# Patient Record
Sex: MALE | Race: WHITE | NOT HISPANIC OR LATINO | Employment: FULL TIME | URBAN - METROPOLITAN AREA
[De-identification: names, ages, dates, MRNs, and addresses within clinical notes are randomized per-mention and may not be internally consistent; named-entity substitution may affect disease eponyms.]

---

## 2024-10-23 NOTE — PROGRESS NOTES
Subjective   Patient ID: Delonte Long is a 53 y.o. male who presents for No chief complaint on file..  Sore Throat   This is a chronic problem. The current episode started more than 1 year ago. The problem has been waxing and waning. Neither side of throat is experiencing more pain than the other. There has been no fever. The pain is at a severity of 2/10. The pain is mild. Associated symptoms include coughing, a hoarse voice and swollen glands. Pertinent negatives include no abdominal pain, congestion, diarrhea, drooling, ear discharge, ear pain, headaches, plugged ear sensation, neck pain, shortness of breath, stridor, trouble swallowing or vomiting.       Review of Systems   HENT:  Positive for hoarse voice and sore throat. Negative for congestion, drooling, ear discharge, ear pain and trouble swallowing.    Respiratory:  Positive for cough. Negative for shortness of breath and stridor.    Gastrointestinal:  Negative for abdominal pain, diarrhea and vomiting.   Musculoskeletal:  Negative for neck pain.   Neurological:  Negative for headaches.       Past Medical History:   Diagnosis Date    Allergy status to unspecified drugs, medicaments and biological substances     History of allergy        No current outpatient medications on file.     Past Surgical History:   Procedure Laterality Date    OTHER SURGICAL HISTORY  03/19/2021    Knee arthroscopy        Social History     Tobacco Use    Smoking status: Not on file    Smokeless tobacco: Not on file   Substance Use Topics    Alcohol use: Not on file        No family history on file.    Not on File  There were no vitals taken for this visit.    Objective   Physical Exam  Examination:    CONSTITUTIONAL: Alert, in no acute distress, normal pitch/clarity of voice, well-developed, well-nourished, cooperative.  HEAD/FACE: Normocephalic, atraumatic, no tenderness over the sinuses, facial strength and movement symmetric.    SKIN: Good turgor, no rashes, no suspicious  lesions, in the head and neck.    EYES: Both eyes have normal extraocular movements with no nystagmus, pupils are equal and reactive to light and accommodation, conjunctiva is clear.    EARS: Both ears are negative for external skin abnormalities, external auditory canals are without lesions or signs of inflammation, tympanic membranes are intact and are of normal color and texture, no effusions are seen, light reflexes normal, no mastoid tenderness is noted to palpation, objective hearing is intact.    NOSE: No external skin lesions are noted, nares are patent, septum is intact with anterior deviation to the right and mid the posterior to of the left, and noninflamed, nasal turbinates are normal in appearance, sinuses are nontender to palpation bilaterally, no internal lesions or polyps are noted, no discharge is noted.    OROPHARYNX/ORAL CAVITY: Mucous membranes of the oropharynx and the oral cavity proper are without lesions or ulcerations, tongue mobility is normal and no lesions are noted, gingiva and alveolar mucosa is intact without lesions, oral mucosa is moist, muscular movement of the palate and gag reflex are normal.    NASOPHARYNX: Mucous membranes are noninflamed and no secretions or lesions are noted.    LARYNX: No mucosal inflammation or exudates are noted, arytenoids are normal in appearance and mobility, false vocal cords are without lesions as is the remainder of the supraglottic larynx, true vocal folds are mobile without inflammation or obstructions and no masses or lesions are noted in the endolarynx.    NECK: No lymphadenopathy is palpated, neck is supple with full range of motion, thyroid is without swelling or tenderness, trachea is midline, no neck masses are noted.    Lymphatics: No cervical adenopathy or supraclavicular adenopathy noted to palpation.    HEART/VASCULAR: No jugular venous distention is noted, carotid pulsations are intact with a regular rate and rhythm noted,    PULMONARY:  Good air movement with normal inspiratory/expiratory effort is noted, no audible wheezing is appreciated.    NEUROLOGIC: Alert and oriented, cranial nerves are grossly intact, gait is normal, sensation in the head and neck is intact,    PSYCH: oriented to person, place and time, normal mood and affect.    EXTREMITIES: No motor dysfunction of the upper and lower extremity is noted.    Patient ID: Delonte Long is a 53 y.o. male.    Laryngoscopy    Date/Time: 10/24/2024 10:55 AM    Performed by: Addy Mcfadden DMD, MD  Authorized by: Addy Mcfadden DMD, MD    Consent:     Consent obtained:  Verbal    Consent given by:  Patient    Risks discussed:  Pain    Alternatives discussed:  No treatment and referral  Procedure details:     Indications: hoarseness, dysphagia, or aspiration    Post-procedure details:     Patient tolerance of procedure:  Tolerated well, no immediate complications  Comments:      LARYNGOSCOPY    Indications: Muscle tension dysphonia, postnasal discharge    Consent:  The procedure was discussed including the possible risks and benefits as well as alternative treatments with the patient. Verbal consent was obtained.    Procedure:  Topical Mark-Synephrine and 4% lidocaine  was applied as a decongestant and anesthetic nasally. A fiberoptic laryngoscope is inserted nasally and the upper aerodigestive tract is examined.    Findings: Nasally on the right-hand side there was anterior deviation of the septum, no mucous membrane inflammation polyps or purulent discharge was noted.  Left-hand side there was a mid to posterior septal spurring and deviation.  No polyps or purulent discharge was noted.  The nasopharynx was patent with no lesions.  Both eustachian tube openings were intact.  Base the tongue was symmetric with no lesions.  Pharyngeal walls showed some pharyngeal studding but no evidence for obstructions or lesions.  The endolarynx revealed vocal cords that were mobile good coloration good  closure of the glottis.  There was no postcricoid inflammation or swelling or entrapment of mucous noted.    Post procedure:  The patient tolerated the procedure well without complications.    Assessment/Plan   Muscle tension dysphonia  Nasal discharge  Nasal septal deviation  Hypertrophy  History of allergies  History of sleep apnea       I discussed the clinical finds with the patient.  From the standpoint of his anatomy there is no signs of acute inflammation and/or difficulties with polyps or movement disorders of the larynx.  He does have symptoms suggesting muscle tension dysphonia and also has some reactive airway issues.  He is going to be scheduled for speech therapy and I did discuss with him nasal discharge issues and treatment using saline rinses in conjunction with his antihistamine use.  I also felt he should use Pepcid once daily for any potential LPR association with his symptoms.  If therapy is helpful then no further intervention would be needed but if he continues to have difficulties then a referral to  laryngology would be done.  I did not see any evidence for infections in the respiratory tract or in his larynx.    Addy Mcfadden DMD, MD 10/23/24 2:02 PM

## 2024-10-24 ENCOUNTER — APPOINTMENT (OUTPATIENT)
Dept: OTOLARYNGOLOGY | Facility: CLINIC | Age: 54
End: 2024-10-24
Payer: COMMERCIAL

## 2024-10-24 VITALS — WEIGHT: 170 LBS | BODY MASS INDEX: 25.18 KG/M2 | HEIGHT: 69 IN

## 2024-10-24 DIAGNOSIS — J34.2 NASAL SEPTAL DEVIATION: ICD-10-CM

## 2024-10-24 DIAGNOSIS — Z88.9 HISTORY OF ALLERGY: ICD-10-CM

## 2024-10-24 DIAGNOSIS — Z86.69 HISTORY OF SLEEP APNEA: ICD-10-CM

## 2024-10-24 DIAGNOSIS — R49.0 HOARSENESS OF VOICE: ICD-10-CM

## 2024-10-24 DIAGNOSIS — R05.3 CHRONIC COUGH: ICD-10-CM

## 2024-10-24 DIAGNOSIS — R09.82 POSTNASAL DISCHARGE: ICD-10-CM

## 2024-10-24 DIAGNOSIS — R07.0 THROAT PAIN IN ADULT: Primary | ICD-10-CM

## 2024-10-24 PROCEDURE — 1036F TOBACCO NON-USER: CPT | Performed by: OTOLARYNGOLOGY

## 2024-10-24 PROCEDURE — 3008F BODY MASS INDEX DOCD: CPT | Performed by: OTOLARYNGOLOGY

## 2024-10-24 PROCEDURE — 31575 DIAGNOSTIC LARYNGOSCOPY: CPT | Performed by: OTOLARYNGOLOGY

## 2024-10-24 PROCEDURE — 99203 OFFICE O/P NEW LOW 30 MIN: CPT | Performed by: OTOLARYNGOLOGY

## 2024-10-24 ASSESSMENT — ENCOUNTER SYMPTOMS
DIARRHEA: 0
NECK PAIN: 0
HEADACHES: 0
HOARSE VOICE: 1
SHORTNESS OF BREATH: 0
VOMITING: 0
STRIDOR: 0
SORE THROAT: 1
COUGH: 1
SWOLLEN GLANDS: 1
TROUBLE SWALLOWING: 0
ABDOMINAL PAIN: 0

## 2024-12-10 ENCOUNTER — OFFICE VISIT (OUTPATIENT)
Dept: URGENT CARE | Age: 54
End: 2024-12-10
Payer: COMMERCIAL

## 2024-12-10 VITALS
SYSTOLIC BLOOD PRESSURE: 148 MMHG | OXYGEN SATURATION: 98 % | DIASTOLIC BLOOD PRESSURE: 90 MMHG | TEMPERATURE: 100.5 F | HEART RATE: 97 BPM

## 2024-12-10 DIAGNOSIS — J22 LOWER RESPIRATORY INFECTION: Primary | ICD-10-CM

## 2024-12-10 PROCEDURE — 99213 OFFICE O/P EST LOW 20 MIN: CPT | Performed by: PHYSICIAN ASSISTANT

## 2024-12-10 RX ORDER — AZITHROMYCIN 250 MG/1
TABLET, FILM COATED ORAL
Qty: 6 TABLET | Refills: 0 | Status: SHIPPED | OUTPATIENT
Start: 2024-12-10 | End: 2024-12-15

## 2024-12-10 ASSESSMENT — ENCOUNTER SYMPTOMS
SHORTNESS OF BREATH: 0
FEVER: 1
COUGH: 1
WHEEZING: 1
DIAPHORESIS: 1
CHEST TIGHTNESS: 0
SORE THROAT: 1
RHINORRHEA: 1
CHILLS: 1

## 2024-12-10 NOTE — PROGRESS NOTES
Subjective   Patient ID: Delonte Long is a 54 y.o. male. They present today with a chief complaint of Nasal Congestion (Cough, drainage, chills).    History of Present Illness  Patient presents for a one week history of upper respiratory illness symptoms including cough, chest congestion, post nasal drainage, sore throat, fevers and chills, runny nose. He states that he is prone to pneumonia. He has noticed slight wheezing in his breathing at times period he states that he does not have a history of asthma and does not smoke. He denies any nasal congestion or ear pain. He feels that his symptoms are worsening over time.          Past Medical History  Allergies as of 12/10/2024    (No Known Allergies)       (Not in a hospital admission)       Past Medical History:   Diagnosis Date    Allergy status to unspecified drugs, medicaments and biological substances     History of allergy       Past Surgical History:   Procedure Laterality Date    OTHER SURGICAL HISTORY  03/19/2021    Knee arthroscopy        reports that he has never smoked. He has never used smokeless tobacco. He reports current alcohol use of about 5.0 standard drinks of alcohol per week. He reports that he does not use drugs.    Review of Systems  Review of Systems   Constitutional:  Positive for chills, diaphoresis and fever.   HENT:  Positive for postnasal drip, rhinorrhea and sore throat. Negative for congestion and ear pain.    Respiratory:  Positive for cough and wheezing. Negative for chest tightness and shortness of breath.                                   Objective    Vitals:    12/10/24 1610 12/10/24 1633   BP: (!) 158/92 148/90   Pulse: 97    Temp: (!) 38.1 °C (100.5 °F)    SpO2: 98%      No LMP for male patient.    Physical Exam  Vitals reviewed.   Constitutional:       General: He is not in acute distress.     Appearance: He is not ill-appearing.   HENT:      Head: Normocephalic.      Right Ear: Tympanic membrane and ear canal normal.       Left Ear: Tympanic membrane and ear canal normal.      Nose: Congestion present. No rhinorrhea.      Mouth/Throat:      Pharynx: No oropharyngeal exudate or posterior oropharyngeal erythema.   Cardiovascular:      Rate and Rhythm: Normal rate and regular rhythm.      Heart sounds: Normal heart sounds.   Pulmonary:      Effort: Pulmonary effort is normal. No respiratory distress.      Breath sounds: Normal breath sounds. No wheezing, rhonchi or rales.   Lymphadenopathy:      Cervical: Cervical adenopathy present.   Neurological:      Mental Status: He is alert.         Procedures    Point of Care Test & Imaging Results from this visit  No results found for this visit on 12/10/24.   No results found.    Diagnostic study results (if any) were reviewed by Tanika Pfeiffer PA-C.    Assessment/Plan   Allergies, medications, history, and pertinent labs/EKGs/Imaging reviewed by Tanika Pfeiffer PA-C.     Medical Decision Making  MDM- History and exam consistent with acute lower respiratory tract infection. Given length of symptoms, no influenza or COVID testing done.  Based on current exam and past medical history, plan is for antibiotics and symptomatic therapies. Patient advised to return to clinic or go to the ED if symptoms change or worsen. Patient verbalized understanding and agrees with plan.      Orders and Diagnoses  Diagnoses and all orders for this visit:  Lower respiratory infection  -     azithromycin (Zithromax) 250 mg tablet; Take 2 tabs (500 mg) by mouth today, than 1 daily for 4 days.      Medical Admin Record      Patient disposition: Home    Electronically signed by Tanika Pfeiffer PA-C  4:34 PM

## 2025-06-12 DIAGNOSIS — Z12.11 SCREENING FOR COLORECTAL CANCER: ICD-10-CM

## 2025-06-12 DIAGNOSIS — Z12.12 SCREENING FOR COLORECTAL CANCER: ICD-10-CM
